# Patient Record
Sex: MALE | Race: OTHER | HISPANIC OR LATINO | URBAN - METROPOLITAN AREA
[De-identification: names, ages, dates, MRNs, and addresses within clinical notes are randomized per-mention and may not be internally consistent; named-entity substitution may affect disease eponyms.]

---

## 2019-01-03 ENCOUNTER — EMERGENCY (EMERGENCY)
Facility: HOSPITAL | Age: 34
LOS: 1 days | Discharge: HOME | End: 2019-01-03
Attending: EMERGENCY MEDICINE
Payer: SELF-PAY

## 2019-01-03 VITALS
DIASTOLIC BLOOD PRESSURE: 92 MMHG | HEART RATE: 98 BPM | TEMPERATURE: 98 F | RESPIRATION RATE: 96 BRPM | SYSTOLIC BLOOD PRESSURE: 142 MMHG

## 2019-01-03 VITALS — WEIGHT: 279.99 LBS | HEIGHT: 69 IN

## 2019-01-03 PROCEDURE — 99281 EMR DPT VST MAYX REQ PHY/QHP: CPT

## 2019-01-03 NOTE — ED ADULT TRIAGE NOTE - CHIEF COMPLAINT QUOTE
C/o "pain and growth to left hand 5 th digit x 2 weeks and right lower abdominal pain which I think is a hernia"

## 2019-01-03 NOTE — ED ADULT NURSE NOTE - NS_CALLED_NO_RESPONSE_ED_ALL_ED
Patient called via phone #provided , Patient states he left because he was waiting for 2 hours and does not want to wait anymore ,Patient left without being seen by doctor or this RN, Patient he come back tomorrow , patient was advised to return to ED for worsening symptoms any new concerns .

## 2021-08-12 ENCOUNTER — EMERGENCY (EMERGENCY)
Facility: HOSPITAL | Age: 36
LOS: 1 days | Discharge: HOME | End: 2021-08-12
Attending: EMERGENCY MEDICINE
Payer: SELF-PAY

## 2021-08-12 VITALS
OXYGEN SATURATION: 99 % | SYSTOLIC BLOOD PRESSURE: 147 MMHG | DIASTOLIC BLOOD PRESSURE: 90 MMHG | HEART RATE: 88 BPM | RESPIRATION RATE: 19 BRPM | TEMPERATURE: 98 F | HEIGHT: 69 IN | WEIGHT: 315 LBS

## 2021-08-12 PROCEDURE — 73030 X-RAY EXAM OF SHOULDER: CPT | Mod: 26,RT

## 2021-08-12 PROCEDURE — 72100 X-RAY EXAM L-S SPINE 2/3 VWS: CPT

## 2021-08-12 PROCEDURE — 72100 X-RAY EXAM L-S SPINE 2/3 VWS: CPT | Mod: 26

## 2021-08-12 PROCEDURE — 99284 EMERGENCY DEPT VISIT MOD MDM: CPT | Mod: 25

## 2021-08-12 PROCEDURE — 73030 X-RAY EXAM OF SHOULDER: CPT

## 2021-08-12 PROCEDURE — 99284 EMERGENCY DEPT VISIT MOD MDM: CPT

## 2021-08-12 RX ORDER — METHOCARBAMOL 500 MG/1
1 TABLET, FILM COATED ORAL
Qty: 7 | Refills: 0
Start: 2021-08-12 | End: 2021-08-18

## 2021-08-12 RX ORDER — IBUPROFEN 200 MG
1 TABLET ORAL
Qty: 28 | Refills: 0
Start: 2021-08-12 | End: 2021-08-18

## 2021-08-12 NOTE — ED PROVIDER NOTE - CLINICAL SUMMARY MEDICAL DECISION MAKING FREE TEXT BOX
Will get x-ray of the right shoulder and lower back and will reevaluate. Will get x-ray of the right shoulder and lower back and will reevaluate.normal xrays. pain control/muscle relaxant/fu pmd for further management

## 2021-08-12 NOTE — ED PROVIDER NOTE - PATIENT PORTAL LINK FT
You can access the FollowMyHealth Patient Portal offered by [facility] by registering at the following website: [email or web address]. By joining [facility]’s FollowMyHealth portal, you will also be able to view your health information using other applications (apps) compatible with our system.

## 2021-08-12 NOTE — ED PROVIDER NOTE - PHYSICAL EXAMINATION
Neck had no discoloration, full range of motion, and was non-tender.    Lower back has mild tenderness upon palpation, and there is a lump at L5 and S1.    Right shoulder was not tender upon palpation and there was no deformity or swelling. Full range of motion.
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
## 2021-08-12 NOTE — ED PROVIDER NOTE - NS ED MD EM SELECTION
Problem: Patient Care Overview  Goal: Plan of Care/Patient Progress Review  Outcome: Improving  BP 94/60 (BP Location: Left arm)  Pulse 107  Temp 98.2  F (36.8  C) (Oral)  Resp 18  SpO2 99%    Patient Afebrile, VSS. Patient  A/Ox4 but anxious prior to taking Seroquel. pt enies pain/ nausea overnight.  refused scheduled seroquel for previous shift, however did take @0230, due to insomnia and agitation. Patient NPO after midnite. Patient appears to rest between cares, Cont with POC.    Patient c/o pain and md paged. Order received for tylenole 650. Patient given med. Cont with POC.   [id number] Exp Problem Focused - Mod. Complex

## 2021-08-12 NOTE — ED ADULT NURSE NOTE - NSIMPLEMENTINTERV_GEN_ALL_ED
Implemented All Universal Safety Interventions:  [place] to call system. Call bell, personal items and telephone within reach. Instruct patient to call for assistance. Room bathroom lighting operational. Non-slip footwear when patient is off stretcher. Physically safe environment: no spills, clutter or unnecessary equipment. Stretcher in lowest position, wheels locked, appropriate side rails in place.

## 2024-07-01 NOTE — ED ADULT TRIAGE NOTE - HISTORY OF COVID-19 VACCINATION
TRANSFER - OUT REPORT:    Verbal report given to NAA Cisneros on Roldan Edmondson  being transferred to [facility] for routine progression of patient care       Report consisted of patient's Situation, Background, Assessment and   Recommendations(SBAR).     Information from the following report(s) ED Encounter Summary and ED SBAR was reviewed with the receiving nurse.    [place] Fall Assessment:    Presents to emergency department  because of falls (Syncope, seizure, or loss of consciousness): No  Age > 70: Yes  Altered Mental Status, Intoxication with alcohol or substance confusion (Disorientation, impaired judgment, poor safety awaremess, or inability to follow instructions): No  Impaired Mobility: Ambulates or transfers with assistive devices or assistance; Unable to ambulate or transer.: No  Nursing Judgement: No          Lines:   Peripheral IV 07/01/24 Right Forearm (Active)        Opportunity for questions and clarification was provided.      Patient transported with:  Ana Henley RN  07/01/24 [id number]     Yes